# Patient Record
(demographics unavailable — no encounter records)

---

## 2025-07-08 NOTE — ASSESSMENT
[FreeTextEntry1] : 78 yo female presenting today with right advanced knee oa with medial joint space narrowing. Patient came into the office today wearing lateral offloading brace. Patient has had visco injections in the past with initial relief and then became less and less effective. Patient reporting that her pain is interfering with her ADLs/QOL. -Discussed with patient that when pain worsens that they will be indicated for TKA, patient understands -We talked about the details of the surgery, the recovery, the material risks and possible benefits and alternatives, including but not limited to infection, bleeding, need for blood transfusion, need for reoperation, stiffness, possible damage to nerves and blood vessels, failure of fixation of the components, risk of deep venous thrombosis and pulmonary embolism, wound healing problems and risk of dislocation and leg length discrepancy. We talked about bearing surface options and discussed the benefits and potential risks with each. The patient understands these risks and questions were answered. The patient understands they will need medical clearance. The patient understands they will be on anticoagulant therapy post op and the benefits and risks of. -Activities as tolerated -Rest, ice, compression, elevation, NSAIDs PRN for pain. -All questions answered -F/u after surgery  Assessment & Indication:   The patient has progressively severe knee pain and disability secondary to degenerative joint disease (DJD) and has failed extensive nontreatments, including activity modification, analgesic medications, and therapeutic exercise. Based on persistent symptoms and lack of response to non-surgical management, I have recommended a [ ] Total Knee Replacement (TKR).   Procedure Discussion: A detailed discussion was held regarding the nature of the total knee replacement procedure, including surgical steps, expected recovery timeline, and anticipated outcomes. A model of the implant to be used was utilized to demonstrate the various bearing surfaces and functionality.  Expected Recovery & Rehabilitation:  The patient was informed of the expected time course for return of function and pain relief. We discussed the importance of compliance with postoperative instructions to facilitate optimal recovery and minimize complications. Emphasis was placed on active participation in rehabilitation and its impact on both short- and long-term outcomes. The discharge plan includes home care nursing and physical therapy, provided it is appropriate and covered by the patient's insurance. The patient was encouraged to arrange for assistance at home following surgery.  Outcomes & Expectations: We reviewed the expected surgical outcomes, the likelihood of satisfaction after full recovery, and potential causes of dissatisfaction, including the possibility of recurrent pain, lack of improvement, or worsening pain.   Risks & Complications: The patient was informed in detail about the risks associated with total knee replacement, including but not limited to: Surgical risks: Infection, wound complications, bone fracture, tendon or ligament injury, nerve injury, vascular injury, hemorrhage, stiffness, instability, persistent pain, fixation failure, need for reoperation or manipulation under anesthesia. Perioperative medical risks: Deep vein thrombosis (DVT), pulmonary embolism, heart attack, stroke, cardiopulmonary complications, and other risks related to medical comorbidities, including elevated body mass index (BMI). Anesthesia risks: Potential complications related to anesthesia, including death, were also discussed. Though I defer to the anesthesia team to discuss complications of anesthesia procedures. To mitigate these risks, we will use sterile technique, perioperative antibiotics, and DVT prophylaxis when appropriate. The patient will be monitored postoperatively in the office setting to track recovery progress.  Implant Selection & Durability: We discussed the durability of prosthetic knees and potential long-term concerns, including wear, osteolysis, and loosening. I plan to use a Arnol Personna implant, which I feel most comfortable utilizing for primary TKR.  If the patient prefers a different implant brand/type, they may request it, and I will consider the request or suggest an additional surgical opinion from a surgeon using that brand.  Preoperative Medical Clearance: The patient was advised of the need for a preoperative medical risk evaluation by their primary care physician (PCP). Additional subspecialty clearances, such as cardiac evaluation, may be required based on the PCPs assessment.  Informed Consent & Next Steps: The risks, benefits, and alternatives to surgery were discussed at length. The patient actively participated in the discussion, had their questions answered, and agreed to proceed with elective TKR. They were instructed to coordinate with my surgical team for scheduling, preoperative testing, and medical optimization.  Follow-up will be arranged for surgery, or sooner if needed.   The diagnosis was explained in detail. The potential non-surgical and surgical treatments were reviewed. The relative risks and benefits of each option were considered relative to the patients age, activity level, medical history, symptom severity and previously attempted treatments.   The patient was advised to consult with their primary medical provider prior to initiation of any new medications to reduce the risk of adverse effects specific to their long-term home medications and medical history.   The patient's current medications management of their orthopedic diagnosis was reviewed today:   1) We discussed a comprehensive treatment plan that included possible pharmaceutical management involving the use of prescription strength medications including but not limited to options as oral Naprosyn 500mg BID, once daily Meloxicam 15 mg, or 500-650 mg Tylenol versus over-the-counter oral medications and topical prescriptions NSAID Pennsaid vs over the counter Voltaren gel.   2) There is a moderate risk of morbidity with further treatment, especially from use of prescription strength medications and possible side effects of these medications which include upset stomach with oral medications, skin reactions to topical medications and cardiac/renal issues with long term use.   3) I recommended that the patient follow-up with their medical physician to discuss any significant specific potential issues with long term medication use such as interactions with current medications or with exacerbation of underlying medical comorbidities.   4) The benefits and risks associated with use of injectable, oral or topical, prescription and over the counter anti-inflammatory medications were discussed with the patient. The patient voiced understanding of the risks including but not limited to bleeding, stroke, kidney dysfunction, heart disease, and were referred to the black box warning label for further information  Entered by Lemuel Gutierrez PA-C acting as scribe. Dr. Davalos Attestation The documentation recorded by the scribe, in my presence, accurately reflects the service I, Dr. Davalos, personally performed, and the decisions made by me with my edits as appropriate.

## 2025-07-08 NOTE — HISTORY OF PRESENT ILLNESS
[Sudden] : sudden [Dull/Aching] : dull/aching [Throbbing] : throbbing [Intermittent] : intermittent [Household chores] : household chores [Leisure] : leisure [Social interactions] : social interactions [Rest] : rest [7] : 7 [2] : 2 [Retired] : Work status: retired [de-identified] : Patient here for right knee. Patient states NKI. Patient states pain started 2-3 years ago. Patient had x-ray done 1/2025. Patient states pain is aching in the medial and lateral aspect of the knee. Patient states she gets pain when walking more than a half hour or when walking up/downstairs. Patient states she had visco injections with no relief 10/2024, PT with no relief. Patient came into office ambulating on her own.  [] : Post Surgical Visit: no [FreeTextEntry1] : Right knee [FreeTextEntry3] : 2-3 years ago  [FreeTextEntry5] : NKI  [de-identified] : Movement  [de-identified] : 10/2024 [de-identified] : Right knee [de-identified] : visco

## 2025-07-08 NOTE — PHYSICAL EXAM
[de-identified] : Examination of the right knee is as follows: INSPECTION: mild effusion, but no ecchymosis, no erythema, no atrophy, no deformities of quad tendon or of patellar tendon PALPATION: medial joint line tenderness, but no palpable mass, no obvious defects, no increased warmth, no calf tenderness ROM: flexion 125 degrees, but extension 0 degrees STRENGTH: quadriceps 5/5, hamstring 5/5 TESTING: ligamentously stable NEURO: light touch is intact throughout GAIT: antalgic, right knee lateral offloading brace, but patient ambulates without assistive device  X-rays of the right knee is as follows:  Knee 3 view in the anteroposterior, lateral, skyline views: advanced tricompartmental OA with medial joint space narrowing and varus alignment